# Patient Record
Sex: MALE | Race: BLACK OR AFRICAN AMERICAN | NOT HISPANIC OR LATINO | Employment: OTHER | URBAN - METROPOLITAN AREA
[De-identification: names, ages, dates, MRNs, and addresses within clinical notes are randomized per-mention and may not be internally consistent; named-entity substitution may affect disease eponyms.]

---

## 2021-01-27 ENCOUNTER — OFFICE VISIT CONVERTED (OUTPATIENT)
Dept: NEUROLOGY | Facility: CLINIC | Age: 41
End: 2021-01-27
Attending: PSYCHIATRY & NEUROLOGY

## 2021-02-16 ENCOUNTER — HOSPITAL ENCOUNTER (OUTPATIENT)
Dept: MRI IMAGING | Facility: HOSPITAL | Age: 41
Discharge: HOME | End: 2021-02-16
Attending: PSYCHIATRY & NEUROLOGY

## 2021-04-30 ENCOUNTER — OFFICE VISIT CONVERTED (OUTPATIENT)
Dept: NEUROLOGY | Facility: CLINIC | Age: 41
End: 2021-04-30
Attending: PSYCHIATRY & NEUROLOGY

## 2021-05-10 NOTE — H&P
History and Physical      Patient Name: Zafar Hodge   Patient ID: [id number]   Sex: Male   YOB: 1980    Primary Care Provider: Ky Molina MD   Referring Provider: Ky Molina MD    Visit Date: January 27, 2021    Provider: Chester Cloud MD   Location: [facility]   Location Address: [address] [place] [address]  [id number]   Location Phone: [phone]          Chief Complaint     New patient presenting with persistent headaches       History Of Present Illness  Zafar Hodge is a 40 year old /Black male who presents today to [facility] today referred from Ky Molina MD.      40-year-old man evaluated for headaches.  He states that he started having headaches in his mid 30s.  They are occurring 2-3 times a year lasting for 2 hours at a time.  Those headaches were noted to be moderate to severe associated with light and noise sensitivity.  He states that in the past 2 months the headaches have occurred several times a month.  In December occurred 20 times a month and in January almost on a daily basis.  He states that the headaches she usually starts in the top of his head to the front of his head and it feels like an ice pick that is sharp.  It is continuous.  Sometimes it is throbbing.  He states that he has had 20 headaches in December and one headache lasted for 7 days and that was a severe headache there was associated nausea and vomiting with it for 2 days.  Restless headaches where moderate to severe in intensity that he had a hard time functioning.  There was light and noise sensitivity.  The other headaches would last for 2 to 3 days.  In January he had a severe headache that lasted for 3 days.  In January had 10-15 moderate to severe headaches that lasted for 1 day and he had 1 severe headache in the second week that lasted for 3 days.  He has been taking Tylenol and ibuprofen and it is not helping him  as well.  He has never taken prophylactic antimigraine therapy in the past such as topiramate, amitriptyline, propranolol.    He was seeing a neurologist in [place] for several years since he had a history of stroke in 2005 which is right side predominantly his right arm and leg was weak for 2 months.  There is also some facial weakness which was mild.  He states that it affected his mobility at that time.  He is 90% recovered.  Imaging 3 years ago did show some evidence of an abnormality.  I do not have any of the reports.       Past Medical History  Headache; High blood pressure; Stroke; History of         Past Surgical History  *Denies any surgical procedures         Medication List  Aimovig Autoinjector 70 mg/mL subcutaneous auto-injector; aspirin 81 mg oral tablet,delayed release (DR/EC)         Allergy List  NO KNOWN DRUG ALLERGIES       Allergies Reconciled  Family Medical History  Family history of stroke         Social History  Tobacco (Former)         Review of Systems  · Constitutional  o Denies  o : chills, excessive sweating, fatigue, fever, sycope/passing out, weight gain, weight loss  · Eyes  o Denies  o : changes in vision, blurred vision, double vision  · HENT  o Denies  o : hearing loss, ringing in the ears, ear aches, sore throat, nasal congestion, sinus pain, nose bleeds, seasonal allergies  · Cardiovascular  o Denies  o : blood clots, swollen legs, anemia, easy burising or bleeding, transfusions  · Respiratory  o Denies  o : shortness of breath, dry cough, productive cough, pneumonia, COPD  · Gastrointestinal  o Denies  o : dysphagia, reflux  · Genitourinary  o Denies  o : incontinence  · Neurologic  o Admits  o : headache, stroke  o Denies  o : seizure, tremor, loss of balance, falls, dizziness/vertigo, difficulty with sleep, numbness/tingling/paresthesia , difficulty with coordination, difficulty with dexterity, weakness  · Musculoskeletal  o Denies  o : neck stiffness/pain, swollen lymph  "nodes, muscle aches, joint pain, weakness, spasms, sciatica, pain radiating in arm, pain radiating in leg, low back pain  · Endocrine  o Denies  o : diabetes, thyroid disorder  · Psychiatric  o Denies  o : anxiety, depression      Vitals  Date Time BP Position Site L\R Cuff Size HR RR TEMP (F) WT  HT  BMI kg/m2 BSA m2 O2 Sat FR L/min FiO2 HC       01/27/2021 02:05 /80 Sitting    72 - R  97.3 253lbs 2oz 6'  0.75\" 33.63 2.43             Physical Examination     He is alert, fluent, phasic, follows commands well.  Optic disks are normal bilaterally, visual fields of full confrontation, EOMs are full in all directions gaze, facial strength is full, soft palate elevation and tongue are normal.  There is no weakness of the upper or lower extremities in these muscle testing.  There is no pronator drift.  Fine finger movements are intact.  Reflexes are normoactive in his biceps, triceps, patellar's and ankles not tested.  Cerebellar testing is intact.  Sensation symmetrical to pinprick.  Station gait is able to tiptoe, heel walk, zina and tandem.  Heart is regular in rhythm normal in rate.           Assessment  · Chronic migraine without aura     346.70/G43.709  I discussed with him that he has chronic migraine headaches. I given the option of getting an Aimovig injection x1 to see if it helps his headache. If it helps his headache he is not to start taking topiramate. If it does not help his headache in the next 1 to 2 weeks can start taking topiramate 25 mg initially and increase the dose by 25 mg every week in 2 divided doses until is taken up to 50 mg twice a day. I explained to him the adverse effects of topiramate including paresthesias, rarely kidney stones, metabolic acidosis, weight loss, kidney stones. I also told him it can cause cognitive issues, mood swings and depression. He is to call our office if his headaches do not improve otherwise I will see him again in 2 months time for follow-up. I will order " an MRI of the brain since there is a new change in the character of his headache.    I explained to the patient the adverse effects of Aimovig injection and gave him literature. Also showed him how to self inject the medication and was given Aimovig 70 mg in the office by the nurse.    Total time spent with patient coordinating patient care was 60 minutes.      Plan  · Orders  o MRI brain wo contrast (55718) - 346.70/G43.709 - 2021  · Medications  o topiramate 25 mg oral tablet   SI QD X 1 wk, 1 BID 2nd wk, 1 Q AM and 2 Q PM 3rd wk and 2 BID 4th wk and thereafter.   DISP: (120) Tablet with 6 refills  Prescribed on 2021     o Medications have been Reconciled  o Transition of Care or Provider Policy  · Instructions  o Encouraged to follow-up with Primary Care Provider for preventative care.            Electronically Signed by: Chester Cloud MD -Author on 2021 05:16:29 PM

## 2021-05-14 VITALS
DIASTOLIC BLOOD PRESSURE: 96 MMHG | SYSTOLIC BLOOD PRESSURE: 168 MMHG | BODY MASS INDEX: 34.13 KG/M2 | WEIGHT: 252 LBS | HEIGHT: 72 IN | HEART RATE: 95 BPM

## 2021-05-14 VITALS
DIASTOLIC BLOOD PRESSURE: 80 MMHG | SYSTOLIC BLOOD PRESSURE: 145 MMHG | TEMPERATURE: 97.3 F | HEART RATE: 72 BPM | BODY MASS INDEX: 34.28 KG/M2 | WEIGHT: 253.12 LBS | HEIGHT: 72 IN

## 2021-05-28 ENCOUNTER — CONVERSION ENCOUNTER (OUTPATIENT)
Dept: OTHER | Facility: HOSPITAL | Age: 41
End: 2021-05-28

## 2021-06-23 RX ORDER — ERENUMAB-AOOE 70 MG/ML
INJECTION SUBCUTANEOUS
Qty: 1 ML | OUTPATIENT
Start: 2021-06-23

## 2021-06-23 NOTE — TELEPHONE ENCOUNTER
"\"Should have any problems with amitriptyline I will give him Aimovig injection in the future\" per last office note. He has a f/u scheduled for 07/30/2021.   "

## 2021-07-15 VITALS
SYSTOLIC BLOOD PRESSURE: 127 MMHG | DIASTOLIC BLOOD PRESSURE: 81 MMHG | HEIGHT: 66 IN | BODY MASS INDEX: 44.36 KG/M2 | HEART RATE: 99 BPM | WEIGHT: 276 LBS | RESPIRATION RATE: 18 BRPM

## 2021-07-30 ENCOUNTER — OFFICE VISIT (OUTPATIENT)
Dept: NEUROLOGY | Facility: CLINIC | Age: 41
End: 2021-07-30

## 2021-07-30 VITALS
DIASTOLIC BLOOD PRESSURE: 82 MMHG | HEIGHT: 66 IN | SYSTOLIC BLOOD PRESSURE: 120 MMHG | BODY MASS INDEX: 40.18 KG/M2 | HEART RATE: 76 BPM | WEIGHT: 250 LBS

## 2021-07-30 DIAGNOSIS — G43.109 CHRONIC MIGRAINE WITH AURA: Primary | ICD-10-CM

## 2021-07-30 PROBLEM — G43.009 MIGRAINE WITHOUT AURA AND WITHOUT STATUS MIGRAINOSUS, NOT INTRACTABLE: Status: RESOLVED | Noted: 2021-07-30 | Resolved: 2021-07-30

## 2021-07-30 PROBLEM — G43.009 MIGRAINE WITHOUT AURA AND WITHOUT STATUS MIGRAINOSUS, NOT INTRACTABLE: Status: ACTIVE | Noted: 2021-07-30

## 2021-07-30 PROBLEM — G43.E09 CHRONIC MIGRAINE WITH AURA: Status: ACTIVE | Noted: 2021-07-30

## 2021-07-30 PROCEDURE — 99212 OFFICE O/P EST SF 10 MIN: CPT | Performed by: PSYCHIATRY & NEUROLOGY

## 2021-07-30 RX ORDER — ERENUMAB-AOOE 70 MG/ML
1 INJECTION SUBCUTANEOUS
COMMUNITY
Start: 2021-05-28 | End: 2021-07-30 | Stop reason: SDUPTHER

## 2021-07-30 RX ORDER — ASPIRIN 81 MG/1
81 TABLET ORAL DAILY
COMMUNITY

## 2021-07-30 RX ORDER — ERENUMAB-AOOE 70 MG/ML
INJECTION SUBCUTANEOUS
Qty: 1 ML | OUTPATIENT
Start: 2021-07-30

## 2021-07-30 RX ORDER — ERENUMAB-AOOE 70 MG/ML
1 INJECTION SUBCUTANEOUS
Qty: 1.12 ML | Refills: 8 | Status: SHIPPED | OUTPATIENT
Start: 2021-07-30 | End: 2022-07-18 | Stop reason: SDUPTHER

## 2021-07-30 RX ORDER — DIPHENOXYLATE HYDROCHLORIDE AND ATROPINE SULFATE 2.5; .025 MG/1; MG/1
TABLET ORAL DAILY
COMMUNITY

## 2021-07-30 NOTE — PROGRESS NOTES
"Chief Complaint  Migraine    Subjective          Zafar Hodge is a 41 y.o. male who presents to [facility]  History of Present Illness  41-year-old man here for follow-up of his migraine headaches.  He states that he has not had any migraine headaches for the last 3 months since he started taking Aimovig 70 mg that was given to him as a sample in the office.  He could not tolerate topiramate and amitriptyline even though it worked for him.  He was only getting 3-4 migraines a month with either one however it made him foggy and he could not concentrate in  school.  He has no adverse effects from the Aimovig.    He is to get over 20 headaches a month that was severe without prophylactic antimigraine therapy.    Objective   Vital Signs:   /82 (BP Location: Left arm, Patient Position: Sitting, Cuff Size: Large Adult)   Pulse 76   Ht 167.6 cm (66\")   Wt 113 kg (250 lb)   BMI 40.35 kg/m²     Physical Exam   Alert, fluent, phasic, follows commands well.  Optic disenroll bilaterally.  Heart is regular with a normal in rate        Assessment and Plan  Diagnoses and all orders for this visit:    1. Chronic migraine with aura (Primary)  Assessment & Plan:  He is to continue using Aimovig 70 mg subcutaneously monthly.  I will see him again in 6 months time for follow-up.          Other orders  -     Erenumab-aooe (Aimovig) 70 MG/ML prefilled syringe; Inject 1 mL under the skin into the appropriate area as directed Every 30 (Thirty) Days.  Dispense: 1.12 mL; Refill: 8       Total time spent with the patient and coordinating patient care was 15 minutes.    Follow Up  No follow-ups on file.  Patient was given instructions and counseling regarding his condition or for health maintenance advice. Please see specific information pulled into the AVS if appropriate.       "

## 2021-07-30 NOTE — ASSESSMENT & PLAN NOTE
He is to continue using Aimovig 70 mg subcutaneously monthly.  I will see him again in 6 months time for follow-up.

## 2021-11-29 ENCOUNTER — PRIOR AUTHORIZATION (OUTPATIENT)
Dept: NEUROLOGY | Facility: CLINIC | Age: 41
End: 2021-11-29

## 2022-07-18 ENCOUNTER — OFFICE VISIT (OUTPATIENT)
Dept: NEUROLOGY | Facility: CLINIC | Age: 42
End: 2022-07-18

## 2022-07-18 VITALS
SYSTOLIC BLOOD PRESSURE: 124 MMHG | DIASTOLIC BLOOD PRESSURE: 80 MMHG | BODY MASS INDEX: 40.18 KG/M2 | HEART RATE: 80 BPM | WEIGHT: 250 LBS | HEIGHT: 66 IN

## 2022-07-18 DIAGNOSIS — F39 MOOD DISORDER: Primary | ICD-10-CM

## 2022-07-18 DIAGNOSIS — G43.109 CHRONIC MIGRAINE WITH AURA: ICD-10-CM

## 2022-07-18 PROCEDURE — 99214 OFFICE O/P EST MOD 30 MIN: CPT | Performed by: PSYCHIATRY & NEUROLOGY

## 2022-07-18 RX ORDER — ERENUMAB-AOOE 70 MG/ML
1 INJECTION SUBCUTANEOUS
Qty: 1.12 ML | Refills: 8 | Status: SHIPPED | OUTPATIENT
Start: 2022-07-18 | End: 2023-01-23 | Stop reason: DRUGHIGH

## 2022-07-18 RX ORDER — SUMATRIPTAN 50 MG/1
TABLET, FILM COATED ORAL
Qty: 8 TABLET | Refills: 5 | Status: SHIPPED | OUTPATIENT
Start: 2022-07-18 | End: 2023-01-23 | Stop reason: ALTCHOICE

## 2022-07-18 RX ORDER — BUPROPION HYDROCHLORIDE 150 MG/1
150 TABLET, EXTENDED RELEASE ORAL DAILY
COMMUNITY

## 2022-07-18 NOTE — ASSESSMENT & PLAN NOTE
He will start taking Aimovig injections 70 mg weekly.  He is to call our office in the next month or so if his headaches are not controlled and I will increase it to 140 mg.  For abortive treatment he is to take amitriptyline 1 at the onset of headache and repeat in 2 hours as needed maximum 2/day or 8/month.  I discussed with him that he should not be taking over-the-counter medications such as Tylenol or Motrin.  I will see him again in 6 months time for follow-up.

## 2022-07-18 NOTE — PROGRESS NOTES
"Chief Complaint  Follow-up    Subjective          Zafar Hodge is a 42 y.o. male who presents to [facility]  History of Present Illness  42-year-old man here for follow-up of his headaches.  He states that he ran out of Aimovig in March since he had to travel.  He states that headaches have become worse since that time and is getting it 15 times in a month.  It can last for 2 to 3 hours.  It is usually moderate to severe in intensity.  Its throbbing.  He has been taking Tylenol and Motrin frequently.  He has never tried triptans in the past.  He states that when he was taking Aimovig 70 mg monthly his headaches were controlled and he was not having any headaches.    Is also complaining of having issues with attention.  He states that his been ongoing for the last 2 years and he does not know if he has ADD.  He states that he was given bupropion for his moods by healthcare provider through telehealth.  He wants a second opinion.    Objective   Vital Signs:   /80 (BP Location: Left arm, Patient Position: Sitting, Cuff Size: Adult)   Pulse 80   Ht 167.6 cm (65.98\")   Wt 113 kg (250 lb)   BMI 40.37 kg/m²     Physical Exam   He is alert, fluent, phasic, follows commands well.  Optic disc are normal bilaterally.  Heart is regular rhythm normal in rate.        Assessment and Plan  Diagnoses and all orders for this visit:    1. Mood disorder (HCC) (Primary)  Assessment & Plan:  I will refer him to psychiatry for his complaint of attention deficit.  It may be psychiatric or organic such as sleep disorder.    Orders:  -     Ambulatory Referral to Psychiatry    2. Chronic migraine with aura  Assessment & Plan:  He will start taking Aimovig injections 70 mg weekly.  He is to call our office in the next month or so if his headaches are not controlled and I will increase it to 140 mg.  For abortive treatment he is to take amitriptyline 1 at the onset of headache and repeat " in 2 hours as needed maximum 2/day or 8/month.  I discussed with him that he should not be taking over-the-counter medications such as Tylenol or Motrin.  I will see him again in 6 months time for follow-up.        Other orders  -     Erenumab-aooe (Aimovig) 70 MG/ML prefilled syringe; Inject 1 mL under the skin into the appropriate area as directed Every 30 (Thirty) Days.  Dispense: 1.12 mL; Refill: 8  -     SUMAtriptan (IMITREX) 50 MG tablet; Take one tablet at onset of headache. May repeat dose one time in 2 hours if headache not relieved.  Dispense: 8 tablet; Refill: 5       Total time spent with the patient and coordinating patient care was 30 minutes.    Follow Up  No follow-ups on file.  Patient was given instructions and counseling regarding his condition or for health maintenance advice. Please see specific information pulled into the AVS if appropriate.

## 2022-07-18 NOTE — ASSESSMENT & PLAN NOTE
I will refer him to psychiatry for his complaint of attention deficit.  It may be psychiatric or organic such as sleep disorder.

## 2023-01-23 ENCOUNTER — OFFICE VISIT (OUTPATIENT)
Dept: NEUROLOGY | Facility: CLINIC | Age: 43
End: 2023-01-23
Payer: OTHER GOVERNMENT

## 2023-01-23 VITALS
HEART RATE: 89 BPM | WEIGHT: 251 LBS | DIASTOLIC BLOOD PRESSURE: 111 MMHG | SYSTOLIC BLOOD PRESSURE: 135 MMHG | HEIGHT: 66 IN | BODY MASS INDEX: 40.34 KG/M2

## 2023-01-23 DIAGNOSIS — G43.109 CHRONIC MIGRAINE WITH AURA: Primary | ICD-10-CM

## 2023-01-23 PROCEDURE — 99213 OFFICE O/P EST LOW 20 MIN: CPT | Performed by: PSYCHIATRY & NEUROLOGY

## 2023-01-23 RX ORDER — RIZATRIPTAN BENZOATE 10 MG/1
TABLET ORAL
Qty: 12 TABLET | Refills: 5 | Status: SHIPPED | OUTPATIENT
Start: 2023-01-23

## 2023-01-23 RX ORDER — ERENUMAB-AOOE 140 MG/ML
140 INJECTION, SOLUTION SUBCUTANEOUS ONCE
Qty: 1 ML | Refills: 5 | Status: SHIPPED | OUTPATIENT
Start: 2023-01-23 | End: 2023-01-23

## 2023-01-23 NOTE — ASSESSMENT & PLAN NOTE
I will increase his Aimovig to 140 mg monthly.  He is to take Maxalt 1 tablet at onset of headache repeat again 2 hours as needed maximum 3/day or 12/month.  He is to stop taking sumatriptan.  He is to call us in the next 3 weeks.  If there is no improvement in his headaches I will start him on Nurtec.  I will see him again in 4 months time for follow-up.

## 2023-01-23 NOTE — PROGRESS NOTES
"Chief Complaint  Follow-up (MEDICATION REFILL SENT TO [facility]. )    Subjective          Zafar Hodge is a 42 y.o. male who presents to [facility]  History of Present Illness  42-year-old man here for follow-up for his migraine headaches.  He states that he was getting migraine headaches that are moderate about 2 days a month about a day or 2 days before his next Aimovig shot.  He is taking Aimovig 70 mg.  He takes sumatriptan for the bad headaches.  He has been getting a daily headache for the last 10 days since his last Aimovig shot.  There are mild headache.  He does not consider the migraine.    Objective   Vital Signs:   BP (!) 135/111 (BP Location: Left arm, Patient Position: Sitting, Cuff Size: Adult)   Pulse 89   Ht 167.6 cm (65.98\")   Wt 114 kg (251 lb)   BMI 40.53 kg/m²     Physical Exam   Alert, fluent, phasic, follows commands well.  Optic disc are normal bilaterally heart is regular rhythm normal in rate.        Assessment and Plan  Diagnoses and all orders for this visit:    1. Chronic migraine with aura (Primary)  Assessment & Plan:  I will increase his Aimovig to 140 mg monthly.  He is to take Maxalt 1 tablet at onset of headache repeat again 2 hours as needed maximum 3/day or 12/month.  He is to stop taking sumatriptan.  He is to call us in the next 3 weeks.  If there is no improvement in his headaches I will start him on Nurtec.  I will see him again in 4 months time for follow-up.        Other orders  -     Erenumab-aooe (Aimovig) 140 MG/ML auto-injector; Inject 1 mL under the skin into the appropriate area as directed 1 (One) Time for 1 dose.  Dispense: 1 mL; Refill: 5  -     rizatriptan (Maxalt) 10 MG tablet; 1 at onset of headache repeat again 2 hours as needed maximum 3/day or 12/month  Dispense: 12 tablet; Refill: 5       Total time spent with the patient and coordinating patient care was 20 minutes.    Follow Up  No follow-ups on " file.  Patient was given instructions and counseling regarding his condition or for health maintenance advice. Please see specific information pulled into the AVS if appropriate.

## 2023-08-03 RX ORDER — ERENUMAB-AOOE 140 MG/ML
140 INJECTION, SOLUTION SUBCUTANEOUS
COMMUNITY
Start: 2023-07-10 | End: 2023-08-03 | Stop reason: SDUPTHER

## 2023-08-03 RX ORDER — ERENUMAB-AOOE 140 MG/ML
140 INJECTION, SOLUTION SUBCUTANEOUS
Qty: 1 ML | Refills: 2 | Status: SHIPPED | OUTPATIENT
Start: 2023-08-03

## 2023-08-03 RX ORDER — RIZATRIPTAN BENZOATE 10 MG/1
TABLET ORAL
Qty: 12 TABLET | Refills: 2 | Status: SHIPPED | OUTPATIENT
Start: 2023-08-03

## 2023-08-17 ENCOUNTER — TELEPHONE (OUTPATIENT)
Dept: NEUROLOGY | Facility: CLINIC | Age: 43
End: 2023-08-17

## 2023-08-17 NOTE — TELEPHONE ENCOUNTER
The [facility] received a fax that requires your attention. The document has been indexed to the patient’s chart for your review.      Reason for sending: TO LET PROVIDER AWARE OF DOCUMENT IN THE PATIENT'S CHART    Documents Description: AMANDA AUTHORIZATION 8/16/23    Name of Sender: AMANDA    Date Indexed: 8/17/23

## 2023-09-28 ENCOUNTER — OFFICE VISIT (OUTPATIENT)
Dept: NEUROLOGY | Facility: CLINIC | Age: 43
End: 2023-09-28
Payer: OTHER GOVERNMENT

## 2023-09-28 VITALS
DIASTOLIC BLOOD PRESSURE: 84 MMHG | WEIGHT: 220 LBS | HEART RATE: 103 BPM | SYSTOLIC BLOOD PRESSURE: 147 MMHG | HEIGHT: 66 IN | BODY MASS INDEX: 35.36 KG/M2

## 2023-09-28 DIAGNOSIS — G43.E09 CHRONIC MIGRAINE WITH AURA: Primary | ICD-10-CM

## 2023-09-28 PROCEDURE — 99213 OFFICE O/P EST LOW 20 MIN: CPT | Performed by: PSYCHIATRY & NEUROLOGY

## 2023-09-28 RX ORDER — DEXTROAMPHETAMINE SACCHARATE, AMPHETAMINE ASPARTATE, DEXTROAMPHETAMINE SULFATE AND AMPHETAMINE SULFATE 2.5; 2.5; 2.5; 2.5 MG/1; MG/1; MG/1; MG/1
10 TABLET ORAL AS NEEDED
COMMUNITY
Start: 2023-09-26

## 2023-09-28 RX ORDER — RIMEGEPANT SULFATE 75 MG/75MG
75 TABLET, ORALLY DISINTEGRATING ORAL DAILY PRN
Qty: 8 TABLET | Refills: 8 | Status: SHIPPED | OUTPATIENT
Start: 2023-09-28

## 2023-09-28 RX ORDER — DEXTROAMPHETAMINE SULFATE, DEXTROAMPHETAMINE SACCHARATE, AMPHETAMINE SULFATE AND AMPHETAMINE ASPARTATE 7.5; 7.5; 7.5; 7.5 MG/1; MG/1; MG/1; MG/1
60 CAPSULE, EXTENDED RELEASE ORAL EVERY MORNING
COMMUNITY
Start: 2023-09-26

## 2023-09-28 RX ORDER — RIMEGEPANT SULFATE 75 MG/75MG
75 TABLET, ORALLY DISINTEGRATING ORAL TAKE AS DIRECTED
Qty: 30 TABLET | Refills: 75 | COMMUNITY
Start: 2023-09-28

## 2023-09-28 NOTE — PROGRESS NOTES
"Chief Complaint  Med Refill (Aimovig.)    Subjective          Zafar Hodge is a 43 y.o. male who presents to [facility]  History of Present Illness  43-year-old man here for follow-up of his headaches.  He states that his migraine headaches have significantly improved and is getting a moderate headache only twice a month lasting for couple hours but the Maxalt did not help him.  Continues to take Aimovig injections.    Objective   Vital Signs:   /84   Pulse 103   Ht 167.6 cm (65.98\")   Wt 99.8 kg (220 lb)   BMI 35.53 kg/m²     Physical Exam   Alert, fluent, phasic, follows commands well.  Heart is regular rhythm normal in rate.        Assessment and Plan  Diagnoses and all orders for this visit:    1. Chronic migraine with aura (Primary)  Assessment & Plan:  I discussed with him that he is to start taking Nurtec 75 mg when he has the breakthrough headaches prior to the Aimovig injections.  He can take up to 1 a day maximum of 8/month.  I did discuss with him that this was the plan 8 months ago however he forgot to call me.  I discussed with him that he is to call our office in the next 6 to 8 weeks if the Nurtec is not helpful to him I will start him on DHE nasal spray.  We will otherwise see him again in 8 months time for follow-up.          Other orders  -     Rimegepant Sulfate (Nurtec) 75 MG tablet dispersible tablet; Take 1 tablet by mouth Daily As Needed (Take 1 tablet as needed maximum 8/month).  Dispense: 8 tablet; Refill: 8         Total time spent with the patient and coordinating patient care was 20 minutes.    Follow Up  No follow-ups on file.  Patient was given instructions and counseling regarding his condition or for health maintenance advice. Please see specific information pulled into the AVS if appropriate.       "

## 2023-09-28 NOTE — ASSESSMENT & PLAN NOTE
I discussed with him that he is to start taking Nurtec 75 mg when he has the breakthrough headaches prior to the Aimovig injections.  He can take up to 1 a day maximum of 8/month.  I did discuss with him that this was the plan 8 months ago however he forgot to call me.  I discussed with him that he is to call our office in the next 6 to 8 weeks if the Nurtec is not helpful to him I will start him on DHE nasal spray.  We will otherwise see him again in 8 months time for follow-up.

## 2023-10-03 ENCOUNTER — PRIOR AUTHORIZATION (OUTPATIENT)
Dept: NEUROLOGY | Facility: CLINIC | Age: 43
End: 2023-10-03
Payer: OTHER GOVERNMENT

## 2023-10-04 NOTE — TELEPHONE ENCOUNTER
Approvedtoday  CaseId:[id number];Status:Approved;Review Type:Prior Auth;Coverage Start Date:09/04/2023;Coverage End Date:04/01/2024;    [employer] message sent.

## 2023-10-25 RX ORDER — ERENUMAB-AOOE 140 MG/ML
140 INJECTION, SOLUTION SUBCUTANEOUS
Qty: 1 ML | Refills: 2 | Status: SHIPPED | OUTPATIENT
Start: 2023-10-25

## 2024-01-23 RX ORDER — ERENUMAB-AOOE 140 MG/ML
140 INJECTION, SOLUTION SUBCUTANEOUS
Qty: 1 ML | Refills: 2 | Status: SHIPPED | OUTPATIENT
Start: 2024-01-23